# Patient Record
Sex: MALE | Employment: FULL TIME | URBAN - METROPOLITAN AREA
[De-identification: names, ages, dates, MRNs, and addresses within clinical notes are randomized per-mention and may not be internally consistent; named-entity substitution may affect disease eponyms.]

---

## 2018-05-03 ENCOUNTER — HOSPITAL ENCOUNTER (EMERGENCY)
Age: 53
Discharge: HOME | End: 2018-05-03
Attending: EMERGENCY MEDICINE
Payer: SELF-PAY

## 2018-05-03 ENCOUNTER — APPOINTMENT (OUTPATIENT)
Dept: GENERAL RADIOLOGY | Age: 53
End: 2018-05-03
Attending: PHYSICIAN ASSISTANT
Payer: SELF-PAY

## 2018-05-03 VITALS
OXYGEN SATURATION: 96 % | HEART RATE: 85 BPM | TEMPERATURE: 98 F | RESPIRATION RATE: 20 BRPM | SYSTOLIC BLOOD PRESSURE: 134 MMHG | DIASTOLIC BLOOD PRESSURE: 82 MMHG | BODY MASS INDEX: 28.63 KG/M2 | WEIGHT: 200 LBS | HEIGHT: 70 IN

## 2018-05-03 DIAGNOSIS — J45.21 MILD INTERMITTENT ASTHMA WITH ACUTE EXACERBATION: Primary | ICD-10-CM

## 2018-05-03 PROCEDURE — 94640 AIRWAY INHALATION TREATMENT: CPT

## 2018-05-03 PROCEDURE — 74011000250 HC RX REV CODE- 250: Performed by: PHYSICIAN ASSISTANT

## 2018-05-03 PROCEDURE — 74011250637 HC RX REV CODE- 250/637: Performed by: PHYSICIAN ASSISTANT

## 2018-05-03 PROCEDURE — 74011250636 HC RX REV CODE- 250/636: Performed by: PHYSICIAN ASSISTANT

## 2018-05-03 PROCEDURE — 99283 EMERGENCY DEPT VISIT LOW MDM: CPT

## 2018-05-03 PROCEDURE — 96372 THER/PROPH/DIAG INJ SC/IM: CPT

## 2018-05-03 PROCEDURE — 77030029684 HC NEB SM VOL KT MONA -A

## 2018-05-03 PROCEDURE — 71046 X-RAY EXAM CHEST 2 VIEWS: CPT

## 2018-05-03 RX ORDER — ALBUTEROL SULFATE 0.83 MG/ML
2.5 SOLUTION RESPIRATORY (INHALATION)
Qty: 50 PACKAGE | Refills: 0 | Status: SHIPPED | OUTPATIENT
Start: 2018-05-03

## 2018-05-03 RX ORDER — ALBUTEROL SULFATE 90 UG/1
1-2 AEROSOL, METERED RESPIRATORY (INHALATION)
Status: DISCONTINUED | OUTPATIENT
Start: 2018-05-03 | End: 2018-05-03 | Stop reason: HOSPADM

## 2018-05-03 RX ORDER — IPRATROPIUM BROMIDE AND ALBUTEROL SULFATE 2.5; .5 MG/3ML; MG/3ML
3 SOLUTION RESPIRATORY (INHALATION)
Status: COMPLETED | OUTPATIENT
Start: 2018-05-03 | End: 2018-05-03

## 2018-05-03 RX ORDER — METHYLPREDNISOLONE 4 MG/1
TABLET ORAL
Qty: 1 DOSE PACK | Refills: 0 | Status: SHIPPED | OUTPATIENT
Start: 2018-05-03

## 2018-05-03 RX ORDER — DEXAMETHASONE SODIUM PHOSPHATE 100 MG/10ML
10 INJECTION INTRAMUSCULAR; INTRAVENOUS
Status: COMPLETED | OUTPATIENT
Start: 2018-05-03 | End: 2018-05-03

## 2018-05-03 RX ADMIN — IPRATROPIUM BROMIDE AND ALBUTEROL SULFATE 3 ML: .5; 3 SOLUTION RESPIRATORY (INHALATION) at 14:27

## 2018-05-03 RX ADMIN — IPRATROPIUM BROMIDE AND ALBUTEROL SULFATE 3 ML: .5; 3 SOLUTION RESPIRATORY (INHALATION) at 13:48

## 2018-05-03 RX ADMIN — DEXAMETHASONE SODIUM PHOSPHATE 10 MG: 10 INJECTION INTRAMUSCULAR; INTRAVENOUS at 13:47

## 2018-05-03 RX ADMIN — ALBUTEROL SULFATE 2 PUFF: 90 AEROSOL, METERED RESPIRATORY (INHALATION) at 14:35

## 2018-05-03 NOTE — ED PROVIDER NOTES
EMERGENCY DEPARTMENT HISTORY AND PHYSICAL EXAM    Date: 5/3/2018  Patient Name: Christa Atkinson    History of Presenting Illness     Chief Complaint   Patient presents with    Allergies         History Provided By: Patient    HPI: Christa Atkinson is a 52 y.o. male with a PMH of asthma and gout who presents with non productive cough and wheezing x 1-2wks. Pt states in the last week symptoms have worsened and he has been using neb tx about 2-3x /day. Pt is no longer a smoker since 2008. Pt denies any CP currently and no fevers. Pt denies any pain currently. PCP: Laura Loaiza MD    Current Facility-Administered Medications   Medication Dose Route Frequency Provider Last Rate Last Dose    albuterol (PROVENTIL HFA, VENTOLIN HFA, PROAIR HFA) inhaler 1-2 Puff  1-2 Puff Inhalation Q4H PRN Jose J Ruiz PA-C        albuterol-ipratropium (DUO-NEB) 2.5 MG-0.5 MG/3 ML  3 mL Nebulization NOW Mary Davis PA-C         Current Outpatient Prescriptions   Medication Sig Dispense Refill    methylPREDNISolone (MEDROL, XANDER,) 4 mg tablet Take as instructed 1 Dose Pack 0    albuterol (PROVENTIL VENTOLIN) 2.5 mg /3 mL (0.083 %) nebulizer solution 3 mL by Nebulization route every four (4) hours as needed for Wheezing or Shortness of Breath. 50 Package 0    VENTOLIN HFA 90 mcg/actuation inhaler INHALE ONE PUFF EVERY 4 HOURS AS NEEDED FOR WHEEZING 1 Inhaler 3       Past History     Past Medical History:  Past Medical History:   Diagnosis Date    Asthma     Gout        Past Surgical History:  History reviewed. No pertinent surgical history. Family History:  Family History   Problem Relation Age of Onset    Cancer Mother      colon       Social History:  Social History   Substance Use Topics    Smoking status: Former Smoker    Smokeless tobacco: Never Used    Alcohol use Yes       Allergies:  No Known Allergies      Review of Systems   Review of Systems   Constitutional: Negative for fever.    Respiratory: Positive for cough and wheezing. Negative for shortness of breath and stridor. Cardiovascular: Negative for chest pain. Neurological: Negative for speech difficulty and weakness. All other systems reviewed and are negative. Physical Exam     Vitals:    05/03/18 1308   BP: 134/82   Pulse: 85   Resp: 20   Temp: 98 °F (36.7 °C)   SpO2: 96%   Weight: 90.7 kg (200 lb)   Height: 5' 10\" (1.778 m)     Physical Exam   Constitutional: He is oriented to person, place, and time. He appears well-developed and well-nourished. No distress. HENT:   Head: Normocephalic and atraumatic. Mouth/Throat: Oropharynx is clear and moist. No oropharyngeal exudate. Eyes: Conjunctivae are normal.   Cardiovascular: Normal rate, regular rhythm and normal heart sounds. Pulmonary/Chest: Effort normal. No respiratory distress. He has wheezes (expiratory) in the right middle field, the right lower field, the left middle field and the left lower field. He has no rales. Musculoskeletal: Normal range of motion. Neurological: He is alert and oriented to person, place, and time. Skin: Skin is warm and dry. Psychiatric: He has a normal mood and affect. His behavior is normal. Judgment and thought content normal.   Nursing note and vitals reviewed. at 2:30 PM    Diagnostic Study Results     Labs -   No results found for this or any previous visit (from the past 12 hour(s)). Radiologic Studies -   XR CHEST PA LAT   Final Result        CT Results  (Last 48 hours)    None        CXR Results  (Last 48 hours)               05/03/18 1404  XR CHEST PA LAT Final result    Impression:  Impression: No acute process. No pneumonia           Narrative:  Exam:  2 view chest       Indication: Cough times one to 2 weeks, history of asthma. COMPARISON: 4/18/2012       PA and lateral views demonstrate normal heart size. There is no acute process in   the lung fields. The osseous structures are unremarkable.  There is a left-sided cervical rib. Medical Decision Making   I am the first provider for this patient. I reviewed the vital signs, available nursing notes, past medical history, past surgical history, family history and social history. Vital Signs-Reviewed the patient's vital signs. ED Course:   2:26 PM  Pt reevaluated, still has some minimal expiratory wheezes in the lower bases, will give one more duoneb prior to discharge    Disposition:  Discharged    DISCHARGE NOTE:   2:30 PM        Care plan outlined and precautions discussed. Patient has no new complaints, changes, or physical findings. Results of CXR were reviewed with the patient. All medications were reviewed with the patient; will d/c home. All of pt's questions and concerns were addressed. Patient was instructed and agrees to follow up with PCP, as well as to return to the ED upon further deterioration. Patient is ready to go home. Follow-up Information     Follow up With Details Comments Contact Info    Frank Geronimo MD Schedule an appointment as soon as possible for a visit on 5/7/2018 As needed [facility] [id number]  [phone]            Current Discharge Medication List      START taking these medications    Details   methylPREDNISolone (MEDROL, XANDER,) 4 mg tablet Take as instructed  Qty: 1 Dose Pack, Refills: 0      albuterol (PROVENTIL VENTOLIN) 2.5 mg /3 mL (0.083 %) nebulizer solution 3 mL by Nebulization route every four (4) hours as needed for Wheezing or Shortness of Breath. Qty: 48 Package, Refills: 0         CONTINUE these medications which have NOT CHANGED    Details   VENTOLIN HFA 90 mcg/actuation inhaler INHALE ONE PUFF EVERY 4 HOURS AS NEEDED FOR WHEEZING  Qty: 1 Inhaler, Refills: 3             Provider Notes (Medical Decision Making):   DDX: asthma exacerbation, URI, bronchitis  Procedures        Diagnosis     Clinical Impression:   1.  Mild intermittent asthma with acute exacerbation

## 2018-05-03 NOTE — ED NOTES
Assumed pt care for task only. Patient discharged to home at this time with self. Patient provided with written instructions and 0 written prescriptions. All questions answered.

## 2018-05-03 NOTE — DISCHARGE INSTRUCTIONS

## 2018-05-03 NOTE — ED NOTES
Patient here with c/o SOB and wheezing r/t seasonal allergies. Patient states that he has been using his nebulizer machine at home but states that he has been out of medicine solution to add to the nebulizer and states that he has been borrowing medicine from friends. Patient states that he is scared of what happens if he runs out. Patient states hx of bad seasonal allergies, states similar episodes re-occurring each year. Denies fevers. Emergency Department Nursing Plan of Care       The Nursing Plan of Care is developed from the Nursing assessment and Emergency Department Attending provider initial evaluation. The plan of care may be reviewed in the ED Provider note.     The Plan of Care was developed with the following considerations:   Patient / Family readiness to learn indicated by:verbalized understanding  Persons(s) to be included in education: patient  Barriers to Learning/Limitations:No    Signed     Pricilla Anton RN    5/3/2018   1:45 PM

## 2019-03-22 ENCOUNTER — HOSPITAL ENCOUNTER (EMERGENCY)
Age: 54
Discharge: HOME | End: 2019-03-22
Attending: EMERGENCY MEDICINE
Payer: COMMERCIAL

## 2019-03-22 ENCOUNTER — APPOINTMENT (OUTPATIENT)
Dept: GENERAL RADIOLOGY | Age: 54
End: 2019-03-22
Attending: EMERGENCY MEDICINE
Payer: COMMERCIAL

## 2019-03-22 VITALS
WEIGHT: 212.74 LBS | BODY MASS INDEX: 30.46 KG/M2 | HEART RATE: 97 BPM | HEIGHT: 70 IN | TEMPERATURE: 98.1 F | SYSTOLIC BLOOD PRESSURE: 151 MMHG | DIASTOLIC BLOOD PRESSURE: 88 MMHG | RESPIRATION RATE: 18 BRPM | OXYGEN SATURATION: 99 %

## 2019-03-22 DIAGNOSIS — M10.9 ACUTE GOUT INVOLVING TOE OF LEFT FOOT, UNSPECIFIED CAUSE: Primary | ICD-10-CM

## 2019-03-22 PROCEDURE — 74011636637 HC RX REV CODE- 636/637: Performed by: PHYSICIAN ASSISTANT

## 2019-03-22 PROCEDURE — 99283 EMERGENCY DEPT VISIT LOW MDM: CPT

## 2019-03-22 PROCEDURE — 73630 X-RAY EXAM OF FOOT: CPT

## 2019-03-22 PROCEDURE — 74011250637 HC RX REV CODE- 250/637: Performed by: PHYSICIAN ASSISTANT

## 2019-03-22 RX ORDER — INDOMETHACIN 50 MG/1
50 CAPSULE ORAL 3 TIMES DAILY
Qty: 21 CAP | Refills: 0 | Status: SHIPPED | OUTPATIENT
Start: 2019-03-22 | End: 2019-03-29

## 2019-03-22 RX ORDER — COLCHICINE 0.6 MG/1
TABLET ORAL
Qty: 3 TAB | Refills: 0 | Status: SHIPPED | OUTPATIENT
Start: 2019-03-22

## 2019-03-22 RX ORDER — HYDROCODONE BITARTRATE AND ACETAMINOPHEN 7.5; 325 MG/1; MG/1
1 TABLET ORAL
Status: COMPLETED | OUTPATIENT
Start: 2019-03-22 | End: 2019-03-22

## 2019-03-22 RX ORDER — INDOMETHACIN 25 MG/1
50 CAPSULE ORAL
Status: COMPLETED | OUTPATIENT
Start: 2019-03-22 | End: 2019-03-22

## 2019-03-22 RX ORDER — PREDNISONE 20 MG/1
20 TABLET ORAL DAILY
Qty: 4 TAB | Refills: 0 | Status: SHIPPED | OUTPATIENT
Start: 2019-03-23 | End: 2019-03-27

## 2019-03-22 RX ORDER — PREDNISONE 20 MG/1
40 TABLET ORAL
Status: COMPLETED | OUTPATIENT
Start: 2019-03-22 | End: 2019-03-22

## 2019-03-22 RX ADMIN — HYDROCODONE BITARTRATE AND ACETAMINOPHEN 1 TABLET: 7.5; 325 TABLET ORAL at 22:30

## 2019-03-22 RX ADMIN — INDOMETHACIN 50 MG: 25 CAPSULE ORAL at 23:05

## 2019-03-22 RX ADMIN — PREDNISONE 40 MG: 20 TABLET ORAL at 23:05

## 2019-03-22 NOTE — LETTER
[address] 
[facility] EMERGENCY DEPT 
[address] [place] [address] [address] [phone] 
[phone] Work/School Note Date: 3/22/2019 To Whom It May concern: 
 
Amelia Archibald was seen and treated today in the emergency room by the following provider(s): 
Attending Provider: Bernardino Chapa MD 
Physician Assistant: KAVITHA Mccrary.    
 
 
 
Sincerely, 
 
 
 
 
KAVITHA Espinoza

## 2019-03-23 NOTE — ED PROVIDER NOTES
EMERGENCY DEPARTMENT HISTORY AND PHYSICAL EXAM 
 
 
Date: 3/22/2019 Patient Name: Jay Posada History of Presenting Illness Chief Complaint Patient presents with  Foot Pain  
  pt with complaint of left foot pain for 2-3 days. pt believes he is having a gout flare up. History Provided By: Patient HPI: Jay Posada, 52 y.o. male with PMHx significant for asthma, gout, presents to the ED with cc of left great toe redness and pain onset 3 days ago. Symptoms had sudden onset, are persistent and severe. Pain is worse with ambulation and palpation. He has tried over-the-counter analgesics without relief. He has a history of gout and says that this feels the same. He denies injury, fever, nausea, vomiting, body aches. PCP: Alex Rogers MD 
 
There are no other complaints, changes, or physical findings at this time. Current Outpatient Medications Medication Sig Dispense Refill  [START ON 3/23/2019] predniSONE (DELTASONE) 20 mg tablet Take 20 mg by mouth daily for 4 days. With Breakfast 4 Tab 0  
 indomethacin (INDOCIN) 50 mg capsule Take 1 Cap by mouth three (3) times daily for 7 days. 21 Cap 0  
 colchicine 0.6 mg tablet Take 2 tablets once, then 1 tablet 1 hour later. 3 Tab 0  
 methylPREDNISolone (MEDROL, XANDER,) 4 mg tablet Take as instructed 1 Dose Pack 0  
 albuterol (PROVENTIL VENTOLIN) 2.5 mg /3 mL (0.083 %) nebulizer solution 3 mL by Nebulization route every four (4) hours as needed for Wheezing or Shortness of Breath. 50 Package 0  
 VENTOLIN HFA 90 mcg/actuation inhaler INHALE ONE PUFF EVERY 4 HOURS AS NEEDED FOR WHEEZING 1 Inhaler 3 Past History Past Medical History: 
Past Medical History:  
Diagnosis Date  Asthma  Gout Past Surgical History: No past surgical history on file. Family History: 
Family History Problem Relation Age of Onset  Cancer Mother   
     colon Social History: 
Social History Tobacco Use  
  Smoking status: Former Smoker  Smokeless tobacco: Never Used Substance Use Topics  Alcohol use: Yes  Drug use: Yes Types: Marijuana Allergies: 
No Known Allergies Review of Systems Review of Systems Constitutional: Negative for chills and fever. HENT: Negative for ear pain and sore throat. Eyes: Negative for redness and visual disturbance. Respiratory: Negative for cough and shortness of breath. Cardiovascular: Negative for chest pain and palpitations. Gastrointestinal: Negative for abdominal pain, nausea and vomiting. Genitourinary: Negative for dysuria and hematuria. Musculoskeletal: Negative for back pain and gait problem. Skin: Negative for rash and wound. Left great toe redness, pain. Neurological: Negative for dizziness and headaches. Psychiatric/Behavioral: Negative for behavioral problems and confusion. All other systems reviewed and are negative. Physical Exam  
Physical Exam  
Constitutional: He is oriented to person, place, and time. He appears well-developed and well-nourished. Uncomfortable appearing. HENT:  
Head: Normocephalic and atraumatic. Eyes: Pupils are equal, round, and reactive to light. Conjunctivae and EOM are normal.  
Neck: Normal range of motion. Neck supple. Cardiovascular: Normal rate, regular rhythm and normal heart sounds. Pulmonary/Chest: Effort normal and breath sounds normal.  
Musculoskeletal: Normal range of motion. Neurological: He is alert and oriented to person, place, and time. He has normal strength. No cranial nerve deficit or sensory deficit. GCS eye subscore is 4. GCS verbal subscore is 5. GCS motor subscore is 6. Skin: Skin is warm and dry. Erythema to the left first MTP with tenderness to palpation, even with light touch. Psychiatric: He has a normal mood and affect. His behavior is normal.  
Nursing note and vitals reviewed. Diagnostic Study Results Labs - 
 No results found for this or any previous visit (from the past 12 hour(s)). Radiologic Studies -  
XR FOOT LT MIN 3 V Final Result IMPRESSION: No acute abnormality. CT Results  (Last 48 hours) None CXR Results  (Last 48 hours) None Medical Decision Making I am the first provider for this patient. I reviewed the vital signs, available nursing notes, past medical history, past surgical history, family history and social history. Vital Signs-Reviewed the patient's vital signs. Patient Vitals for the past 12 hrs: 
 Temp Pulse Resp BP SpO2  
03/22/19 2254  97  151/88   
03/22/19 2013 98.1 °F (36.7 °C) (!) 103 18 (!) 150/99 99 % Records Reviewed: Nursing Notes and Old Medical Records Provider Notes (Medical Decision Making):  
Patient presents with erythema and pain to his left first MTP joint. Presentation is consistent with gout. I do not suspect septic arthritis or cellulitis. Will treat with analgesics, steroids, colchicine. ED Course:  
Initial assessment performed. The patients presenting problems have been discussed, and they are in agreement with the care plan formulated and outlined with them. I have encouraged them to ask questions as they arise throughout their visit. DISCHARGE NOTE: 
10:55 PM - Aditya Stephen  results have been reviewed with him. He has been counseled regarding his diagnosis. He verbally conveys understanding and agreement of the signs, symptoms, diagnosis, treatment and prognosis and additionally agrees to follow up as recommended with Dr. Taiwo Strauss MD in 24 - 48 hours. He also agrees with the care-plan and conveys that all of his questions have been answered.   I have also put together some discharge instructions for him that include: 1) educational information regarding their diagnosis, 2) how to care for their diagnosis at home, as well a 3) list of reasons why they would want to return to the ED prior to their follow-up appointment, should their condition change. He and/or family's questions have been answered. I have encouraged them to see the official results in [facility]\" or to retrieve the specifics of their results from medical records. PLAN: 
1. Return precautions as discussed 2. Follow-up with providers as directed 3. Medications as prescribed Return to ED if worse Diagnosis Clinical Impression: 1. Acute gout involving toe of left foot, unspecified cause Discharge Medication List as of 3/22/2019 11:05 PM  
  
START taking these medications Details  
predniSONE (DELTASONE) 20 mg tablet Take 20 mg by mouth daily for 4 days. With Breakfast, Normal, Disp-4 Tab, R-0  
  
indomethacin (INDOCIN) 50 mg capsule Take 1 Cap by mouth three (3) times daily for 7 days. , Normal, Disp-21 Cap, R-0  
  
colchicine 0.6 mg tablet Take 2 tablets once, then 1 tablet 1 hour later., Normal, Disp-3 Tab, R-0  
  
  
CONTINUE these medications which have NOT CHANGED Details  
methylPREDNISolone (MEDROL, XANDER,) 4 mg tablet Take as instructed, Normal, Disp-1 Dose Pack, R-0  
  
albuterol (PROVENTIL VENTOLIN) 2.5 mg /3 mL (0.083 %) nebulizer solution 3 mL by Nebulization route every four (4) hours as needed for Wheezing or Shortness of Breath., Normal, Disp-50 Package, R-0  
  
VENTOLIN HFA 90 mcg/actuation inhaler INHALE ONE PUFF EVERY 4 HOURS AS NEEDED FOR WHEEZING, Normal, Disp-1 Inhaler, R-3 Follow-up Information Follow up With Specialties Details Why Contact Info Alex Rogers MD Internal Medicine Schedule an appointment as soon as possible for a visit in 2 days for a recheck [address] [phone] [facility] EMERGENCY DEPT Emergency Medicine Go to If symptoms worsen [address] [facility] 
[phone]

## 2019-03-23 NOTE — DISCHARGE INSTRUCTIONS
Patient Education        Gout: Care Instructions  Your Care Instructions    Gout is a form of arthritis caused by a buildup of uric acid crystals in a joint. It causes sudden attacks of pain, swelling, redness, and stiffness, usually in one joint, especially the big toe. Gout usually comes on without a cause. But it can be brought on by drinking alcohol (especially beer) or eating seafood and red meat. Taking certain medicines, such as diuretics or aspirin, also can bring on an attack of gout. Taking your medicines as prescribed and following up with your doctor regularly can help you avoid gout attacks in the future. Follow-up care is a key part of your treatment and safety. Be sure to make and go to all appointments, and call your doctor if you are having problems. It's also a good idea to know your test results and keep a list of the medicines you take. How can you care for yourself at home? · If the joint is swollen, put ice or a cold pack on the area for 10 to 20 minutes at a time. Put a thin cloth between the ice and your skin. · Prop up the sore limb on a pillow when you ice it or anytime you sit or lie down during the next 3 days. Try to keep it above the level of your heart. This will help reduce swelling. · Rest sore joints. Avoid activities that put weight or strain on the joints for a few days. Take short rest breaks from your regular activities during the day. · Take your medicines exactly as prescribed. Call your doctor if you think you are having a problem with your medicine. · Take pain medicines exactly as directed. ? If the doctor gave you a prescription medicine for pain, take it as prescribed. ? If you are not taking a prescription pain medicine, ask your doctor if you can take an over-the-counter medicine. · Eat less seafood and red meat. · Check with your doctor before drinking alcohol. · Losing weight, if you are overweight, may help reduce attacks of gout.  But do not go on a \"crash diet. \" Losing a lot of weight in a short amount of time can cause a gout attack. When should you call for help? Call your doctor now or seek immediate medical care if:    · You have a fever.     · The joint is so painful you cannot use it.     · You have sudden, unexplained swelling, redness, warmth, or severe pain in one or more joints.    Watch closely for changes in your health, and be sure to contact your doctor if:    · You have joint pain.     · Your symptoms get worse or are not improving after 2 or 3 days. Where can you learn more? Go to http://jaja-brittanie.info/. Enter M492 in the search box to learn more about \"Gout: Care Instructions. \"  Current as of: Kandice 10, 2018  Content Version: 11.9  © 4230-2841 [employer]. Care instructions adapted under license by [employer] (which disclaims liability or warranty for this information). If you have questions about a medical condition or this instruction, always ask your healthcare professional. [address] any warranty or liability for your use of this information.